# Patient Record
Sex: FEMALE | Race: WHITE | Employment: FULL TIME | URBAN - METROPOLITAN AREA
[De-identification: names, ages, dates, MRNs, and addresses within clinical notes are randomized per-mention and may not be internally consistent; named-entity substitution may affect disease eponyms.]

---

## 2021-02-24 ENCOUNTER — TRANSFERRED RECORDS (OUTPATIENT)
Dept: HEALTH INFORMATION MANAGEMENT | Facility: CLINIC | Age: 36
End: 2021-02-24

## 2021-02-24 ENCOUNTER — MEDICAL CORRESPONDENCE (OUTPATIENT)
Dept: HEALTH INFORMATION MANAGEMENT | Facility: CLINIC | Age: 36
End: 2021-02-24

## 2021-02-26 ENCOUNTER — TELEPHONE (OUTPATIENT)
Dept: OPHTHALMOLOGY | Facility: CLINIC | Age: 36
End: 2021-02-26

## 2021-03-23 DIAGNOSIS — H35.89 OTHER SPECIFIED RETINAL DISORDERS: ICD-10-CM

## 2021-03-23 DIAGNOSIS — H31.011: Primary | ICD-10-CM

## 2021-03-24 ENCOUNTER — ALLIED HEALTH/NURSE VISIT (OUTPATIENT)
Dept: OPHTHALMOLOGY | Facility: CLINIC | Age: 36
End: 2021-03-24
Attending: OPHTHALMOLOGY
Payer: COMMERCIAL

## 2021-03-24 DIAGNOSIS — H35.89 OTHER SPECIFIED RETINAL DISORDERS: ICD-10-CM

## 2021-03-24 DIAGNOSIS — H31.011: ICD-10-CM

## 2021-03-24 PROCEDURE — 999N000103 HC STATISTIC NO CHARGE FACILITY FEE

## 2021-03-24 PROCEDURE — 92273 FULL FIELD ERG W/I&R: CPT

## 2021-03-24 PROCEDURE — 92083 EXTENDED VISUAL FIELD XM: CPT

## 2021-03-24 ASSESSMENT — REFRACTION_WEARINGRX: SPECS_TYPE: SVL

## 2021-03-24 ASSESSMENT — VISUAL ACUITY
CORRECTION_TYPE: GLASSES
OS_CC: 20/20
METHOD: SNELLEN - LINEAR

## 2021-03-24 NOTE — NURSING NOTE
"Referred by Dr. Bojorquez/[facility] for GVF+ffERG.  Last eye exam w/Dr. Bojorquez 02-24-21:  +CR scars right eye ([facility] vs [facility]); poor night vision since ~2012 w/no progression since.  Persistent flashes right eye all day.  Doubt AIR/CAR and advise testing as a precaution.  Pt notices probs in dim light such as missing steps and needing more lighting to see but no probs driving at night.  ?More of an issue w/change in depth perception.  Initially, when CR scars noted, \"flashes\" were only right eye temporally \"all in a row\" which also appeared inferiorly for a while.  Now, she has been noticing flashes (vague in description) in the left eye but she hasn't tested each eye individually.  Believes flashes are present all the time but is brought to her attention when going from dark to bright lighting or changing gaze from the left to straight ahead.  States some anxiety for testing today because she does not do well w/eye exams and this was scheduled at the last minute.  Scheduled for f/u with Dr. Bojorquez/[facility] in 3 months.    "

## 2021-03-25 ASSESSMENT — VISUAL ACUITY
OS_CC+: -
OD_CC: 20/15

## 2021-03-25 ASSESSMENT — REFRACTION_WEARINGRX
OS_SPHERE: -8.25
OD_SPHERE: -6.50
OS_CYLINDER: +1.00
OS_AXIS: 099

## 2021-03-25 NOTE — PROGRESS NOTES
STANDARD ERG REPORT      Referring: Koozekanani from [facility]    RE:  Analilia Colby  MRN:  [id number]  :  1985    ERG Date:  3/24/2021    DIAGNOSTIC INDICATION: The patient is a 36 year old woman with a history of photopsias and chorioretinal scarring.  A ffERG and GVF were requested to r/o CAR/AIR spectrum disorders.        DIAGNOSTIC FINDINGS (GVF)  A GVF was obtained in both eyes.  The GVF was reliable and normal in both eyes.       DIAGNOSTIC FINDINGS: (ERG)   A full field ERG was obtained in both eye using DTL electrodes and ISCEV standards.  Per the technician, the recording was essentially reliable in both eyes.      The results for the two eyes were similar. Under scotopic conditions, both eyes were normal.  In particular, the dim white flash amplitudes were normal and the implicit times were normal.  The response to the bright white flash was essentially normal.  In particular, the A-wave amplitudes were normal and the implicit times was normal.  The B-wave amplitudes were normal and the implicit times were normal.   Oscillatory potentials were normal.     Under photopic conditions, the recordings were essentially normal in each eye.  In particular, the 30 Hz flicker amplitudes were normal and the implicit times were normal.    The single photopic flash morphology was normal in each right eye.  Numerically,  the A-wave amplitudes were normal and the implicit times were normal.  The B-wave amplitudes were normal and the implicit times were normal.  The 30Hz flicker is more reliable for numerical measurements of the photopic response.                    Visual Acuity Right Eye : 20/15      w/gls, -6.50sph    Visual Acuity Left Eye : 20/20-      w/gls, -8.25 + 1.00x099                                                ALL AVERAGED                 Data for Full-Field ERG  Right Eye  Left Eye   DARK-ADAPTED Patient Normal Patient   0.01 ERG (gerson) b-wave amplitude ( v) 236 50.3 to 387.3 247   0.01 ERG  (gerson) b-wave implicit time (ms) 87.6 70.7 to 112.5 94.3         3.0 ERG (combined) a-wave amplitude ( v) -159 -319.8 to -82 -164   3.0 ERG (combined) a-wave implicit time (ms) 17.2 11.1 to 18.9 17.2   3.0 ERG (combined) b-wave amplitude ( v) 312 146.4 to 500.8 317   3.0 ERG (combined) b-wave implicit time (ms) 52.1 37.1 to 56.7 54.4         10.0 ERG (brighter combined) a-wave amplitude ( v) -189 -308 to -94.8 -197   10.0 ERG (brighter combined) a-wave implicit time (ms) 14.4 10.3 to 13.1 13.9   10.0 ERG (brighter combined) b-wave amplitude ( v) 316* 171.7 to 479.3 320   10.0 ERG (brighter combined) b-wave implicit time (ms) 52.1* 33.7 to 54.1 53.8         3.0 Oscillatory Potentials + present +   LIGHT-ADAPTED       3.0 Flicker (30Hz) amplitude ( v) 107 59.2 to 158.6 117(121)   3.0 Flicker (30Hz) implicit time (ms) 27.7 21.8 to 29.6 27.7(93.7)         3.0 ERG (cone) a-wave amplitude ( v) -25 -53.7 to -10.9 -20   3.0 ERG (cone) a-wave implicit time (ms) 15 10.6 to 16 14.4   3.0 ERG (cone) b-wave amplitude ( v) 109 72.3 to 185.3 113   3.0 ERG (cone) b-wave implicit time (ms) 31.1 25.7 to 32.1 31.1   * = manipulated cursors  parentheses = cursors at selected peaks  ---- = residual to non-measurable  xxxx = not tested          STANDARD RETeval ERG REPORT,  ISCEV Photopic Flash/Flicker and PhNR cd   --FOR REVIEW ONLY: RETeval w/Sensor Strip = 1/3 Espion3 w/DTL                                                                              ALL AVERAGED  Data for Full-Field ERG Right Eye   Left Eye    Dark-Adapted Patient Normal  Patient   0.01 ERG (gerson) amplitude( v) xxxx 19 to 63 xxxx   0.01 ERG (gerson) implicit time(ms) xxxx 71 to 96 xxxx   MMMMM      3.0 ERG (combined) a-wave amplitude( v) xxxx -56 to -21 xxxx   3.0 ERG (combined) a-wave implicit time(ms) xxxx 12 to 19 xxxx   3.0 ERG (combined) b-wave amplitude( v) xxxx 35 to 104 xxxx   3.0 ERG (combined) b-wave implicit time(ms) xxxx 40 to 59 xxxx   MMMMM      10.0 ERG  (brighter) a-wave amplitude( v) xxxx -69 to -24 xxxx   10.0 ERG (brighter) a-wave implicit time(ms) xxxx 10 to 13 xxxx   10.0 ERG (brighter) b-wave amplitude( v) xxxx 36 to 105 xxxx   10.0 ERG (brighter) b-wave implicit time(ms) xxxx 41 to 62 xxxx         3.0 Oscillatory Potentials xxxx present xxxx    Light-Adapted      3.0 Flicker (30-Hz) amplitude( v) 44.0 18.2 to 55.3 57.0   3.0 Flicker (30-Hz) implicit time(ms) 26.3 23.8 to 28.7 26.1         3.0 ERG (cone) a-wave amplitude( v) -9.9 -16.5 to -2.6 -10.2   3.0 ERG (cone) a-wave implicit time(ms) 12.1 10.1 to 14.3 11.8   3.0 ERG (cone) b-wave amplitude( v) 52.5 18.9 to 66.6 45.3   3.0 ERG (cone) b-wave implicit time(ms) 29.9 26.2 to 31.4 29.4                ---- = residual to non-measurable           xxxx = not tested              Normative values per  Evaluation of light- and dark-adapted ERGs using a mydriasis-free,                                portable system: clinical classifications and normative data  by H Haja, X Dez, S Bere, SN Stanley,                            M Job, A Annie, MELISSA Rod ,HYUN Powell, LYNETTE Key,   [facility] [employer],                            [facility], [facility]. [email or web address]                             Normative values per [facility] data per individual age at time of testing, cd (dilated) and Td (undilated).                    INTERPRETATION:          1. The ffERG is normal in both eyes.  There is no evidence of CAR or AIR on the ffERG  2. The GVF is normal in both eyes.     Sincerely,     Shilpa Bojorquze MD, PhD